# Patient Record
Sex: FEMALE
[De-identification: names, ages, dates, MRNs, and addresses within clinical notes are randomized per-mention and may not be internally consistent; named-entity substitution may affect disease eponyms.]

---

## 2018-05-21 ENCOUNTER — TRANSCRIPTION ENCOUNTER (OUTPATIENT)
Age: 28
End: 2018-05-21

## 2021-03-09 ENCOUNTER — APPOINTMENT (OUTPATIENT)
Dept: UROLOGY | Facility: CLINIC | Age: 31
End: 2021-03-09
Payer: COMMERCIAL

## 2021-03-09 VITALS
HEIGHT: 68 IN | BODY MASS INDEX: 21.22 KG/M2 | SYSTOLIC BLOOD PRESSURE: 121 MMHG | WEIGHT: 140 LBS | OXYGEN SATURATION: 98 % | HEART RATE: 77 BPM | DIASTOLIC BLOOD PRESSURE: 77 MMHG

## 2021-03-09 VITALS — TEMPERATURE: 97.8 F

## 2021-03-09 DIAGNOSIS — N39.0 URINARY TRACT INFECTION, SITE NOT SPECIFIED: ICD-10-CM

## 2021-03-09 DIAGNOSIS — Z00.00 ENCOUNTER FOR GENERAL ADULT MEDICAL EXAMINATION W/OUT ABNORMAL FINDINGS: ICD-10-CM

## 2021-03-09 LAB
BILIRUB UR QL STRIP: NORMAL
CLARITY UR: CLEAR
COLLECTION METHOD: NORMAL
GLUCOSE UR-MCNC: NORMAL
HCG UR QL: 0.2 EU/DL
HGB UR QL STRIP.AUTO: NORMAL
KETONES UR-MCNC: NORMAL
LEUKOCYTE ESTERASE UR QL STRIP: NORMAL
NITRITE UR QL STRIP: NORMAL
PH UR STRIP: 6
PROT UR STRIP-MCNC: NORMAL
SP GR UR STRIP: 1.01

## 2021-03-09 PROCEDURE — 99072 ADDL SUPL MATRL&STAF TM PHE: CPT

## 2021-03-09 PROCEDURE — 51798 US URINE CAPACITY MEASURE: CPT

## 2021-03-09 PROCEDURE — 99204 OFFICE O/P NEW MOD 45 MIN: CPT

## 2021-03-09 PROCEDURE — 81003 URINALYSIS AUTO W/O SCOPE: CPT | Mod: QW

## 2021-03-09 NOTE — LETTER BODY
[Dear  ___] : Dear  [unfilled], [Consult Letter:] : I had the pleasure of evaluating your patient, [unfilled]. [Please see my note below.] : Please see my note below. [Consult Closing:] : Thank you very much for allowing me to participate in the care of this patient.  If you have any questions, please do not hesitate to contact me. [Sincerely,] : Sincerely, [FreeTextEntry3] : Dr. Maral Newman

## 2021-03-09 NOTE — HISTORY OF PRESENT ILLNESS
[FreeTextEntry1] : Patient is a 30 year old  female complaining of irritative voiding symptoms, including frequency, bladder pressure, incomplete emptying, and nocturia. Her symptoms are worsened when lying down. She previously was given Macrobid (2020), and Cipro (2021) with no improvement. She has an IUD which was most recently changed one year ago. \par \par PMH: None\par PSH: ACL repair\par FH: no  malignancies, no stones, hypertension (father), Alzheimer's (maternal grandmother)\par SH: Engaged, weekly EtOH, [place] ([employer]), never a smoker, no caffeinated beverages\par \par Udip: no blood, no leuks\par \par She is sexually active with one partner with no dyspareunia. \par \par UCx: 3/1/21--Contaminated;

## 2021-03-09 NOTE — ASSESSMENT
[FreeTextEntry1] : I discussed the findings and options with Ms. Rossi Sullivan in detail.\par \par I took a catheterized urine specimen to avoid the previous problem with contamination and will send it for culture. I will not give her a prescription for antibiotics at this time, pending the results of the urine culture. In the meantime, she should use anti-inflammatory and a heating pad. We will set up a telehealth visit next week. If she is no better, I will make a referral to physical therapy. \par \par Patient expressed understanding. \par \par PVR #1: 174cc\par PVR #2: 1cc

## 2021-03-10 LAB — URINE CYTOLOGY: NORMAL

## 2021-03-11 ENCOUNTER — TRANSCRIPTION ENCOUNTER (OUTPATIENT)
Age: 31
End: 2021-03-11

## 2021-03-11 LAB — BACTERIA UR CULT: NORMAL
